# Patient Record
(demographics unavailable — no encounter records)

---

## 2025-01-22 NOTE — REVIEW OF SYSTEMS
Home [Fever] : no fever [Fatigue] : no fatigue [Recent Change In Weight] : ~T no recent weight change [Vision Problems] : no vision problems [Hearing Loss] : no hearing loss [Chest Pain] : no chest pain [Palpitations] : no palpitations [Lower Ext Edema] : no lower extremity edema [Orthopena] : no orthopnea [Shortness Of Breath] : no shortness of breath [Cough] : no cough [Dyspnea on Exertion] : not dyspnea on exertion [Nausea] : no nausea [Constipation] : no constipation [Diarrhea] : no diarrhea [Vomiting] : no vomiting [Dysuria] : no dysuria [Incontinence] : no incontinence [Hesitancy] : no hesitancy [Nocturia] : no nocturia [Frequency] : no frequency [Dizziness] : no dizziness [Unsteady Walk] : no ataxia [Memory Loss] : no memory loss [Depression] : no depression

## 2025-01-22 NOTE — ASSESSMENT
[FreeTextEntry1] : # HCM Blood & UA: F/u CMP, CBC, UA, TSH, Lipid, PSA, A1c Colonoscopy: 2018>normal. Cologuard 2025>normal. Immunizations: UTD per pt, Shingrex at age 67.  Influenza shot 11/2024 - Takes baby aspirin 81  # HLD - on atorvastatin 10mg - F/u Lipids  # 1st degree heart block - Cardiologist Dr. Maicol Wolff - sees him twice year - next appt March 2025

## 2025-01-22 NOTE — COUNSELING
[Fall prevention counseling provided] : Fall prevention counseling provided [Behavioral health counseling provided] : Behavioral health counseling provided [de-identified] : Encouraged  to continue current healthy lifestyle behaviors.\par

## 2025-01-22 NOTE — HEALTH RISK ASSESSMENT
[Very Good] : ~his/her~ current health as very good [1 or 2 (0 pts)] : 1 or 2 (0 points) [No falls in past year] : Patient reported no falls in the past year [0] : 2) Feeling down, depressed, or hopeless: Not at all (0) [PHQ-2 Negative - No further assessment needed] : PHQ-2 Negative - No further assessment needed [With Significant Other] : lives with significant other [Retired] : retired [College] : College [] :  [Feels Safe at Home] : Feels safe at home [Fully functional (bathing, dressing, toileting, transferring, walking, feeding)] : Fully functional (bathing, dressing, toileting, transferring, walking, feeding) [Fully functional (using the telephone, shopping, preparing meals, housekeeping, doing laundry, using] : Fully functional and needs no help or supervision to perform IADLs (using the telephone, shopping, preparing meals, housekeeping, doing laundry, using transportation, managing medications and managing finances) [Reports normal functional visual acuity (ie: able to read med bottle)] : Reports normal functional visual acuity [Smoke Detector] : smoke detector [Carbon Monoxide Detector] : carbon monoxide detector [Safety elements used in home] : safety elements used in home [Seat Belt] :  uses seat belt [Sunscreen] : uses sunscreen [Name: ___] : Health Care Proxy's Name: [unfilled]  [Relationship: ___] : Relationship: [unfilled] [Never] : Never [Fair] :  ~his/her~ mood as fair [Never (0 pts)] : Never (0 points) [HIV test declined] : HIV test declined [Hepatitis C test declined] : Hepatitis C test declined [None] : None [Sexually Active] : sexually active [Patient reported colonoscopy was normal] : Patient reported colonoscopy was normal [I will adhere to the patient's wishes.] : I will adhere to the patient's wishes. [NO] : No [No] : In the past 12 months have you used drugs other than those required for medical reasons? No [de-identified] : DENIES  [de-identified] : Ophthalmology, Cardiology [de-identified] : gardening and tennis [de-identified] : diverse vegetables, meats. "too healthy" [[id number]] : o [Change in mental status noted] : No change in mental status noted [Language] : denies difficulty with language [Behavior] : denies difficulty with behavior [Handling Complex Tasks] : denies difficulty handling complex tasks [Reasoning] : denies difficulty with reasoning [High Risk Behavior] : no high risk behavior [Reports changes in hearing] : Reports no changes in hearing [Reports changes in vision] : Reports no changes in vision [Reports changes in dental health] : Reports no changes in dental health [Travel to Developing Areas] : does not  travel to developing areas [TB Exposure] : is not being exposed to tuberculosis [Caregiver Concerns] : does not have caregiver concerns [ColonoscopyDate] : 12/2018 [ColonoscopyComments] : Cologcarolina 2025, neg [de-identified] : Wife Regina [de-identified] :  for [employer] [de-identified] : [facility]  [de-identified] : vision has not gotten better  [AdvancecareDate] : 01/2025 [FreeTextEntry4] : papers with

## 2025-01-22 NOTE — HISTORY OF PRESENT ILLNESS
[FreeTextEntry1] : NPA annual wellness visit [de-identified] : 72 y/o M w PMHx of 1st degree heart block, HLD on atorvastatin presenting to establish care with an annual wellness exam. Feels good and denies CP, palpitations, cough, SOB, dyspnea upon exertion, nausea, vomiting, dysuria, urinary retention, weight loss, changes in hearing/vision.  Gardens and plays tennis weekly.  to Regina [place]; no children Retired as  for [employer].  Cardiologist: Dr. Maicol Guy Wallace Opthalmologist: annual check ups

## 2025-07-22 NOTE — HISTORY OF PRESENT ILLNESS
[FreeTextEntry1] : f/up [de-identified] : 71 y/o M w PMHx of 1st degree heart block, HLD on atorvastatin presenting for f/up. Feels good and denies CP, palpitations, cough, SOB, dyspnea upon exertion, nausea, vomiting, dysuria, urinary retention, weight loss, changes in hearing/vision.  Gardens and plays tennis weekly.  to Regina [place]; no children Retired as  for [employer].  Cardiologist: Dr. Maicol Wolff Opthalmologist: annual check ups

## 2025-07-22 NOTE — HEALTH RISK ASSESSMENT
[No] : In the past 12 months have you used drugs other than those required for medical reasons? No [Never] : Never [No falls in past year] : Patient reported no falls in the past year

## 2025-07-22 NOTE — ASSESSMENT
[FreeTextEntry1] : # HCM CPE 01/22/25 Colonoscopy: 2018>normal. Cologuard 2025>normal. Immunizations: UTD per pt, Shingrex at age 67. Influenza shot 11/2024 - Takes baby aspirin 81  # HLD - on atorvastatin 10mg - Repeat Lipids  # 1st degree heart block - Cardiologist Dr. Maicol Wolff - sees him twice year  F/up for AWV